# Patient Record
Sex: MALE | Race: WHITE | NOT HISPANIC OR LATINO
[De-identification: names, ages, dates, MRNs, and addresses within clinical notes are randomized per-mention and may not be internally consistent; named-entity substitution may affect disease eponyms.]

---

## 2024-08-05 ENCOUNTER — NON-APPOINTMENT (OUTPATIENT)
Age: 24
End: 2024-08-05

## 2024-08-05 ENCOUNTER — APPOINTMENT (OUTPATIENT)
Dept: ORTHOPEDIC SURGERY | Facility: CLINIC | Age: 24
End: 2024-08-05

## 2024-08-05 PROBLEM — Z00.00 ENCOUNTER FOR PREVENTIVE HEALTH EXAMINATION: Status: ACTIVE | Noted: 2024-08-05

## 2024-08-05 PROCEDURE — 73110 X-RAY EXAM OF WRIST: CPT | Mod: 50

## 2024-08-05 PROCEDURE — 99204 OFFICE O/P NEW MOD 45 MIN: CPT

## 2024-08-05 NOTE — HISTORY OF PRESENT ILLNESS
[Right] : right hand dominant [FreeTextEntry1] : Date of injury: August 2, 2024  Patient here 3 days status post left distal radius fracture which was manipulated 3 days ago.  Patient presents in a sugar-tong splint.

## 2024-08-05 NOTE — ASSESSMENT
[FreeTextEntry1] : Intra-articular left distal radius 's type fracture with associated ulnar styloid fracture  The risks, benefits, alternatives and associated differential diagnosis was discussed with the patient. Options ranged from conservative care, therapy to surgical intervention were reviewed and all questions answered. Risks included incomplete resolution of symptoms, worsening of symptoms recurrence, tissue loss, functional loss and other risks associated with treatment of this condition Patient appeared to have an excellent understanding of the risks as well as differential diagnosis associated with this condition. Vitamin C prophylaxis discussed Patient would like to proceed with open reduction internal fixation of the left distal radius intra-articular fracture.  (Arthrex plate) assessment of the scapholunate ligament will be performed under fluoroscopy and if necessary repaired.

## 2024-08-05 NOTE — PHYSICAL EXAM
[de-identified] : After x-rays were reviewed and discussion of the options from surgical to nonsurgical he was considering surgery.  The splint was removed for better evaluation.  The skin was intact there was no evidence of infection the compartments were soft there is no discomfort upon passive flexion extension abduction or adduction of the digits.  There is swelling ecchymosis as well as tenderness localized over the left distal radius with limited range of motion of the radial ulnar joint and wrist joint secondary to pain and discomfort.  Good capillary refill negative Tinel's over the median nerve and sensation grossly intact [de-identified] : PA lateral and oblique of the right wrist shows a  type fracture of the left distal radius with intra-articular extension.  There is an associated ulnar styloid fracture.  PA lateral and oblique of the right wrist shows ulnar negative variance with joint spaces well-preserved  Scapholunate ligament views were limited due to discomfort but there was no evidence of diastases of the scapholunate ligament and between the 2 structures.  It was symmetric to the opposite side.  No other carpal fractures noted. street drug/inhalant/medication abuse/caffeine

## 2024-08-07 ENCOUNTER — TRANSCRIPTION ENCOUNTER (OUTPATIENT)
Age: 24
End: 2024-08-07

## 2024-08-07 RX ORDER — CHLORHEXIDINE GLUCONATE 500 MG/1
1 CLOTH TOPICAL DAILY
Refills: 0 | Status: DISCONTINUED | OUTPATIENT
Start: 2024-08-08 | End: 2024-08-08

## 2024-08-07 NOTE — ASU DISCHARGE PLAN (ADULT/PEDIATRIC) - CARE PROVIDER_API CALL
Danilo Lynn  [facility]  [address]  [place] [address]  Phone: [phone]  Fax: [phone]  Established Patient  Follow Up Time:

## 2024-08-07 NOTE — ASU PATIENT PROFILE, ADULT - FALL HARM RISK - UNIVERSAL INTERVENTIONS
Bed in lowest position, wheels locked, appropriate side rails in place/Call bell, personal items and telephone in reach/Instruct patient to call for assistance before getting out of bed or chair/Non-slip footwear when patient is out of bed/[place] to call system/Physically safe environment - no spills, clutter or unnecessary equipment/Purposeful Proactive Rounding/Room/bathroom lighting operational, light cord in reach

## 2024-08-07 NOTE — ASU PATIENT PROFILE, ADULT - NSICDXPASTMEDICALHX_GEN_ALL_CORE_FT
PAST MEDICAL HISTORY:  No pertinent past medical history PAST MEDICAL HISTORY:  ADD (attention deficit disorder)     History of OCD (obsessive compulsive disorder)     History of wrist fracture left     PAST MEDICAL HISTORY:  ADD (attention deficit disorder)     Childhood asthma     History of OCD (obsessive compulsive disorder)     History of wrist fracture left

## 2024-08-07 NOTE — BRIEF OPERATIVE NOTE - NSICDXBRIEFPOSTOP_GEN_ALL_CORE_FT
POST-OP DIAGNOSIS:  Displaced fracture of distal end of left radius 07-Aug-2024 07:31:39  Adryan Gonzalez

## 2024-08-07 NOTE — ASU DISCHARGE PLAN (ADULT/PEDIATRIC) - NS MD DC FALL RISK RISK
For information on Fall & Injury Prevention, visit: [email or web address] OR  [email or web address] OR  https://www.cdc.gov/steadi/patient.html

## 2024-08-07 NOTE — BRIEF OPERATIVE NOTE - NSICDXBRIEFPROCEDURE_GEN_ALL_CORE_FT
PROCEDURES:  Open reduction and internal fixation (ORIF) of 3 or more fragments of distal radius 07-Aug-2024 07:30:35  Adryan Gonzalez   PROCEDURES:  Open reduction and internal fixation (ORIF) of 3 or more fragments of distal radius 07-Aug-2024 07:30:35 Left distal radius and assessment of SL ligament under fluoroscopy Adryan Gonzalez

## 2024-08-07 NOTE — ASU DISCHARGE PLAN (ADULT/PEDIATRIC) - ASU DC SPECIAL INSTRUCTIONSFT
Co-Directors: Danilo Lynn MD; Gary Sun MD; Julius Colon MD   [facility] [address] 	  [place] [address] 	 	  Phone [phone] (HAND), Fax [phone]    [email or web address]    Hand Surgery Post Operative Instructions      DRESSING CARE:  1.	Please keep bandage ON and DRY until you return to the office for your 1st postoperative visit.   2.	In the shower you must cover bandage with a plastic bag. You can use tape or a rubber band so no water leaks into the bag.   3.	Please do not exercise as that leads to excessive sweating as the bandage will therefore become moist/ wet.    4.	Do not remove or change your bandage. You may apply more tape if dressing starts to unravel.   5.	Please do not insert any objects, such as a pencil, down into the bandage.     ELEVATION:  1.	Keep hand/wrist above heart level at all times or until bandage feels loose. This will help with swelling of the fingers and can be accomplished by using the FOAM PILLOW.   2.	 A sling will not hold your hand/wrist above your heart and therefore its use should be limited (it may also cause shoulder stiffness).     ACTIVITY:  1.	Moving your fingers daily after surgery is very important to prevent stiffness. Please open your fingers completely and close your fingers completely to achieve full range of motion.  **UNLESS TENDON REPAIR OR NERVE REPAIR SURGERY PERFORMED    2.	Move all joints of the extremity that are not immobilized to prevent stiffness (i.e. shoulder, elbow, fingers, and thumb unless instructed otherwise).   3.	Avoid activities which may re-injure your hand or finger.     DIET:   Regular diet. Start light and progress as tolerated.     PAIN MEDICINE:   1.	Pain medicine was sent to your pharmacy.  2.	Take pain medicine on an “AS NEEDED” basis according to your doctor’s instructions.   3.	Your pain will decrease over the next few days allowing you to:   •	Decrease your pain medicine quantity until you stop.   •	Increase the time between doses until you stop.   4.	You should not drink alcoholic beverages while on pain medication.   5.	Take pain medicine with food to prevent nausea.   6.	Constipation can occur. If no bowel movement occurs within 48 hours take a laxative of your choice (over the counter).	 	      CONTACT PHYSICIAN FOR:   Slight pain, swelling and bluish discoloration are to be expected. If you have breathing difficulty or chest pain dial 911 immediately. However, if the following symptoms occur notify your physician:   •	Temperature above 101° F 	        • Inability to urinate in 8 hours   •	Uncontrolled nausea/vomiting   	• Progressively increasing pain   •	Signs of wound infection 	                • Excessive bleeding  (Redness, swelling, pus-like drainage)     • Increasing numbness   •	Excessive swelling and tightness 	• Splint or cast that is too tight      OFFICE APPOINTMENT:    A staff member from the office will call you in the next 1-2 days to schedule your 1st Post Operative appointment to see your physician back in the office. *IF someone does not reach out to you in the next 1-2 days please call the office.      Patient Name _________________________________ Signature ______________________________ Date__________    Witness _____________________________________________________ Date ______________

## 2024-08-07 NOTE — ASU PATIENT PROFILE, ADULT - NSCAFFEINETYPE_GEN_ALL_CORE_SD
coffee Olumiant Counseling: I discussed with the patient the risks of Olumiant therapy including but not limited to upper respiratory tract infections, shingles, cold sores, and nausea. Live vaccines should be avoided.  This medication has been linked to serious infections; higher rate of mortality; malignancy and lymphoproliferative disorders; major adverse cardiovascular events; thrombosis; gastrointestinal perforations; neutropenia; lymphopenia; anemia; liver enzyme elevations; and lipid elevations.

## 2024-08-07 NOTE — ASU PATIENT PROFILE, ADULT - NSICDXPASTSURGICALHX_GEN_ALL_CORE_FT
PAST SURGICAL HISTORY:  H/O oral surgery      Slit Excision Additional Text (Leave Blank If You Do Not Want): A linear line was drawn on the skin overlying the lesion. An incision was made slowly until the lesion was visualized.  Once visualized, the lesion was removed with blunt dissection.

## 2024-08-07 NOTE — ASU PATIENT PROFILE, ADULT - NS PREOP UNDERSTANDS INFO
Time by MD, As per MD nothing to eat or drink after midnight tonight; patient reminded to come with photo ID/insurance/credit card; dress in comfortable clothes; no jewelries/contact lens/valuable; no smoking/alcohol drinking/recreational drug use today; escort to have photo ID; address and callback number was given/yes

## 2024-08-07 NOTE — BRIEF OPERATIVE NOTE - NSICDXBRIEFPREOP_GEN_ALL_CORE_FT
PRE-OP DIAGNOSIS:  Displaced fracture of distal end of left radius 07-Aug-2024 07:31:21  Adryan Gonzalez

## 2024-08-08 ENCOUNTER — APPOINTMENT (OUTPATIENT)
Dept: ORTHOPEDIC SURGERY | Facility: AMBULATORY SURGERY CENTER | Age: 24
End: 2024-08-08

## 2024-08-08 ENCOUNTER — OUTPATIENT (OUTPATIENT)
Dept: OUTPATIENT SERVICES | Facility: HOSPITAL | Age: 24
LOS: 1 days | Discharge: HOME | End: 2024-08-08

## 2024-08-08 VITALS
OXYGEN SATURATION: 98 % | TEMPERATURE: 98 F | DIASTOLIC BLOOD PRESSURE: 68 MMHG | SYSTOLIC BLOOD PRESSURE: 104 MMHG | RESPIRATION RATE: 16 BRPM | HEART RATE: 64 BPM

## 2024-08-08 VITALS
WEIGHT: 205.03 LBS | OXYGEN SATURATION: 99 % | RESPIRATION RATE: 15 BRPM | HEIGHT: 70.5 IN | SYSTOLIC BLOOD PRESSURE: 117 MMHG | DIASTOLIC BLOOD PRESSURE: 76 MMHG | TEMPERATURE: 98 F

## 2024-08-08 DIAGNOSIS — Z98.890 OTHER SPECIFIED POSTPROCEDURAL STATES: Chronic | ICD-10-CM

## 2024-08-08 PROCEDURE — 25609 OPTX DST RD XART FX/EP SEP3+: CPT | Mod: LT

## 2024-08-08 DEVICE — PLATE STD 3H LEFT: Type: IMPLANTABLE DEVICE | Site: LEFT | Status: FUNCTIONAL

## 2024-08-08 DEVICE — SCREW CORT LO PRO 3.5X14MM: Type: IMPLANTABLE DEVICE | Site: LEFT | Status: FUNCTIONAL

## 2024-08-08 DEVICE — SCREW VAL KREULOCK 2.4X16MM: Type: IMPLANTABLE DEVICE | Site: LEFT | Status: FUNCTIONAL

## 2024-08-08 DEVICE — IMPLANTABLE DEVICE: Type: IMPLANTABLE DEVICE | Site: LEFT | Status: FUNCTIONAL

## 2024-08-08 DEVICE — GWIRE TROCAR TIP 1.35X150MM: Type: IMPLANTABLE DEVICE | Site: LEFT | Status: FUNCTIONAL

## 2024-08-08 DEVICE — SCREW LOKG PT NEAR CORT 2.4X18MM: Type: IMPLANTABLE DEVICE | Site: LEFT | Status: FUNCTIONAL

## 2024-08-08 RX ORDER — OXYCODONE HYDROCHLORIDE 30 MG/1
5 TABLET ORAL ONCE
Refills: 0 | Status: DISCONTINUED | OUTPATIENT
Start: 2024-08-08 | End: 2024-08-08

## 2024-08-08 RX ORDER — ONDANSETRON HCL/PF 4 MG/2 ML
4 VIAL (ML) INJECTION ONCE
Refills: 0 | Status: DISCONTINUED | OUTPATIENT
Start: 2024-08-08 | End: 2024-08-08

## 2024-08-08 RX ORDER — DEXTROSE MONOHYDRATE, SODIUM CHLORIDE, SODIUM LACTATE, CALCIUM CHLORIDE, MAGNESIUM CHLORIDE 1.5; 538; 448; 18.4; 5.08 G/100ML; MG/100ML; MG/100ML; MG/100ML; MG/100ML
1000 SOLUTION INTRAPERITONEAL
Refills: 0 | Status: DISCONTINUED | OUTPATIENT
Start: 2024-08-08 | End: 2024-08-08

## 2024-08-08 RX ORDER — LISDEXAMFETAMINE DIMESYLATE 10 MG/1
1 CAPSULE ORAL
Refills: 0 | DISCHARGE

## 2024-08-08 RX ORDER — ACETAMINOPHEN 500 MG
650 TABLET ORAL ONCE
Refills: 0 | Status: DISCONTINUED | OUTPATIENT
Start: 2024-08-08 | End: 2024-08-08

## 2024-08-08 RX ORDER — FLUVOXAMINE MALEATE 50 MG/1
1 TABLET ORAL
Refills: 0 | DISCHARGE

## 2024-08-08 RX ORDER — FLUVOXAMINE MALEATE 50 MG/1
2 TABLET ORAL
Refills: 0 | DISCHARGE

## 2024-08-08 RX ADMIN — CHLORHEXIDINE GLUCONATE 1 APPLICATION(S): 500 CLOTH TOPICAL at 11:31

## 2024-08-08 NOTE — PRE-ANESTHESIA EVALUATION ADULT - NSANTHOSAYNRD_GEN_A_CORE
No. PRANAV screening performed.  STOP BANG Legend: 0-2 = LOW Risk; 3-4 = INTERMEDIATE Risk; 5-8 = HIGH Risk

## 2024-08-12 ENCOUNTER — APPOINTMENT (OUTPATIENT)
Dept: ORTHOPEDIC SURGERY | Facility: CLINIC | Age: 24
End: 2024-08-12
Payer: COMMERCIAL

## 2024-08-12 PROCEDURE — 29075 APPL CST ELBW FNGR SHORT ARM: CPT | Mod: 58,LT

## 2024-08-12 PROCEDURE — 99024 POSTOP FOLLOW-UP VISIT: CPT

## 2024-08-12 NOTE — HISTORY OF PRESENT ILLNESS
[___ Days Post Op] : post op day #[unfilled] [de-identified] : DOS: 8/8/24 [de-identified] : 4 days s/p ORIF left distal radius fracture The dressings got wet from bath water at the level of the elbow. Bandage Change [de-identified] : The incision line is clean and dry he is moving the wrist and digits well without evidence of crepitus or instability good capillary refill negative Tinel's sensation grossly intact. [de-identified] : 4 days s/p ORIF left distal radius fracture [de-identified] : A short arm cast was applied and home program outlined to reduce risk of complications.  Return to the office in 1 week

## 2024-08-19 ENCOUNTER — APPOINTMENT (OUTPATIENT)
Dept: ORTHOPEDIC SURGERY | Facility: CLINIC | Age: 24
End: 2024-08-19
Payer: COMMERCIAL

## 2024-08-19 DIAGNOSIS — S69.92XD UNSPECIFIED INJURY OF LEFT WRIST, HAND AND FINGER(S), SUBSEQUENT ENCOUNTER: ICD-10-CM

## 2024-08-19 PROCEDURE — 73110 X-RAY EXAM OF WRIST: CPT | Mod: LT

## 2024-08-19 PROCEDURE — 99024 POSTOP FOLLOW-UP VISIT: CPT

## 2024-08-19 NOTE — HISTORY OF PRESENT ILLNESS
[___ Days Post Op] : post op day #[unfilled] [Clean/Dry/Intact] : clean, dry and intact [Healed] : healed [Erythema] : not erythematous [Discharge] : absent of discharge [Dehiscence] : not dehisced [Neuro Intact] : an unremarkable neurological exam [Vascular Intact] : ~T peripheral vascular exam normal [Doing Well] : is doing well [No Sign of Infection] : is showing no signs of infection [Adequate Pain Control] : has adequate pain control [de-identified] :  DOS: 8/8/24. [de-identified] : 11 days s/p ORIF left distal radius fracture [de-identified] : The incision line is clean and dry and there is no evidence of infection.  There is no major changes in alignment as compared to previous x-rays surgery.  Good capillary refill negative Tinel's sensation grossly intact. [de-identified] : PA lateral and oblique shows no major lineages in alignment as compared to the operating room. [de-identified] : 11 days s/p ORIF left distal radius fracture [de-identified] : Options were discussed ranging from casting to splinting.  Patient and father both state that he will be extremely compliant with a splint protocol.  A removable splint for hygiene was provided but the importance of wearing it to reduce the risk of loss of reduction was discussed.  Return to the office in 2 weeks earlier if there are issues or concerns discussed

## 2024-08-19 NOTE — HISTORY OF PRESENT ILLNESS
[___ Days Post Op] : post op day #[unfilled] [Clean/Dry/Intact] : clean, dry and intact [Healed] : healed [Erythema] : not erythematous [Discharge] : absent of discharge [Dehiscence] : not dehisced [Neuro Intact] : an unremarkable neurological exam [Vascular Intact] : ~T peripheral vascular exam normal [Doing Well] : is doing well [No Sign of Infection] : is showing no signs of infection [Adequate Pain Control] : has adequate pain control [de-identified] :  DOS: 8/8/24. [de-identified] : 11 days s/p ORIF left distal radius fracture [de-identified] : The incision line is clean and dry and there is no evidence of infection.  There is no major changes in alignment as compared to previous x-rays surgery.  Good capillary refill negative Tinel's sensation grossly intact. [de-identified] : PA lateral and oblique shows no major lineages in alignment as compared to the operating room. [de-identified] : 11 days s/p ORIF left distal radius fracture [de-identified] : Options were discussed ranging from casting to splinting.  Patient and father both state that he will be extremely compliant with a splint protocol.  A removable splint for hygiene was provided but the importance of wearing it to reduce the risk of loss of reduction was discussed.  Return to the office in 2 weeks earlier if there are issues or concerns discussed

## 2024-09-04 ENCOUNTER — APPOINTMENT (OUTPATIENT)
Dept: ORTHOPEDIC SURGERY | Facility: AMBULATORY SURGERY CENTER | Age: 24
End: 2024-09-04
Payer: COMMERCIAL

## 2024-09-04 DIAGNOSIS — S69.92XD UNSPECIFIED INJURY OF LEFT WRIST, HAND AND FINGER(S), SUBSEQUENT ENCOUNTER: ICD-10-CM

## 2024-09-04 PROCEDURE — 73110 X-RAY EXAM OF WRIST: CPT | Mod: LT

## 2024-09-04 PROCEDURE — 99024 POSTOP FOLLOW-UP VISIT: CPT

## 2024-09-04 NOTE — HISTORY OF PRESENT ILLNESS
[___ Weeks Post Op] : [unfilled] weeks post op [de-identified] : DOS: 8/8/24 [de-identified] : 4 weeks status post open reduction internal fixation of left distal radius fracture [de-identified] : PA lateral and oblique of the left wrist: No major changes in alignment with hardware intact.  Callus formation around the fracture site. [de-identified] : The incision line is clean, dry, and intact.  No evidence of infection.  Negative Tinel's.  EPL, FPL are 5 out of 5 strength.  Patient's neurovascular grossly intact. [de-identified] : 4 weeks status post open reduction internal fixation of left distal radius fracture [de-identified] : Home program outlined to reduce the risk of complications Referral to outside occupational therapy Return to the office in 2 weeks to see Dr. Lynn

## 2024-09-04 NOTE — HISTORY OF PRESENT ILLNESS
[___ Weeks Post Op] : [unfilled] weeks post op [de-identified] : DOS: 8/8/24 [de-identified] : 4 weeks status post open reduction internal fixation of left distal radius fracture [de-identified] : The incision line is clean, dry, and intact.  No evidence of infection.  Negative Tinel's.  EPL, FPL are 5 out of 5 strength.  Patient's neurovascular grossly intact. [de-identified] : PA lateral and oblique of the left wrist: No major changes in alignment with hardware intact.  Callus formation around the fracture site. [de-identified] : 4 weeks status post open reduction internal fixation of left distal radius fracture [de-identified] : Home program outlined to reduce the risk of complications Referral to outside occupational therapy Return to the office in 2 weeks to see Dr. Lynn

## 2024-09-23 ENCOUNTER — TRANSCRIPTION ENCOUNTER (OUTPATIENT)
Age: 24
End: 2024-09-23

## 2024-09-23 ENCOUNTER — APPOINTMENT (OUTPATIENT)
Dept: ORTHOPEDIC SURGERY | Facility: CLINIC | Age: 24
End: 2024-09-23
Payer: COMMERCIAL

## 2024-09-23 DIAGNOSIS — S69.92XD UNSPECIFIED INJURY OF LEFT WRIST, HAND AND FINGER(S), SUBSEQUENT ENCOUNTER: ICD-10-CM

## 2024-09-23 PROCEDURE — 99024 POSTOP FOLLOW-UP VISIT: CPT

## 2024-09-23 PROCEDURE — 73110 X-RAY EXAM OF WRIST: CPT | Mod: LT

## 2024-09-23 NOTE — HISTORY OF PRESENT ILLNESS
[de-identified] : DOS: 08/08/2024 [de-identified] : 6 weeks, 4 days s/p open reduction and internal fixation of left distal radius intraarticular fracture utilizing the Arthrex plate  Patient has been doing a home program but has not started formal therapy. [de-identified] : Scar is well-healed no evidence of infection no tenderness over the fracture site carpus or radial ulnar joint or ulnar styloid.  No evidence of instability no tenderness over the flexor or extensor tendons. Pronation/supination is 80/80 symmetric bilaterally Flexion/extension is 80/60 on the right 60/45 on the left  strength 60 pounds on the right 40 pounds on the left Right hand dominant [de-identified] : PA lateral and oblique of the left wrist shows hardware in place with maintained alignment [de-identified] : 6 weeks, 4 days s/p open reduction and internal fixation of left distal radius intraarticular fracture utilizing the Arthrex plate [de-identified] : A home program was outlined which included weaning from the splint as well as activity modifications.  Encouraged him to follow-up with an outside therapist.  Return to the office in 6 to 8 weeks

## 2024-09-23 NOTE — HISTORY OF PRESENT ILLNESS
[de-identified] : DOS: 08/08/2024 [de-identified] : 6 weeks, 4 days s/p open reduction and internal fixation of left distal radius intraarticular fracture utilizing the Arthrex plate  Patient has been doing a home program but has not started formal therapy. [de-identified] : Scar is well-healed no evidence of infection no tenderness over the fracture site carpus or radial ulnar joint or ulnar styloid.  No evidence of instability no tenderness over the flexor or extensor tendons. Pronation/supination is 80/80 symmetric bilaterally Flexion/extension is 80/60 on the right 60/45 on the left  strength 60 pounds on the right 40 pounds on the left Right hand dominant [de-identified] : PA lateral and oblique of the left wrist shows hardware in place with maintained alignment [de-identified] : 6 weeks, 4 days s/p open reduction and internal fixation of left distal radius intraarticular fracture utilizing the Arthrex plate [de-identified] : A home program was outlined which included weaning from the splint as well as activity modifications.  Encouraged him to follow-up with an outside therapist.  Return to the office in 6 to 8 weeks

## 2024-11-18 ENCOUNTER — APPOINTMENT (OUTPATIENT)
Dept: ORTHOPEDIC SURGERY | Facility: CLINIC | Age: 24
End: 2024-11-18
Payer: COMMERCIAL

## 2024-11-18 VITALS — BODY MASS INDEX: 27.92 KG/M2 | HEIGHT: 70 IN | WEIGHT: 195 LBS | RESPIRATION RATE: 16 BRPM

## 2024-11-18 DIAGNOSIS — S69.92XD UNSPECIFIED INJURY OF LEFT WRIST, HAND AND FINGER(S), SUBSEQUENT ENCOUNTER: ICD-10-CM

## 2024-11-18 PROCEDURE — 73110 X-RAY EXAM OF WRIST: CPT | Mod: LT

## 2024-11-18 PROCEDURE — 99213 OFFICE O/P EST LOW 20 MIN: CPT

## 2025-01-16 ENCOUNTER — APPOINTMENT (OUTPATIENT)
Dept: ORTHOPEDIC SURGERY | Facility: CLINIC | Age: 25
End: 2025-01-16
Payer: COMMERCIAL

## 2025-01-16 DIAGNOSIS — S69.92XD UNSPECIFIED INJURY OF LEFT WRIST, HAND AND FINGER(S), SUBSEQUENT ENCOUNTER: ICD-10-CM

## 2025-01-16 PROCEDURE — 73110 X-RAY EXAM OF WRIST: CPT | Mod: LT

## 2025-01-16 PROCEDURE — 99213 OFFICE O/P EST LOW 20 MIN: CPT

## (undated) DEVICE — SUT VICRYL 4-0 18" P-3 UNDYED

## (undated) DEVICE — BLADE SCALPEL SAFETY #15 WITH PLASTIC GREEN HANDLE

## (undated) DEVICE — GLV 8 PROTEXIS (WHITE)

## (undated) DEVICE — SUT ETHILON 5-0 18" P-3

## (undated) DEVICE — DRSG STERISTRIPS 0.5 X 4"

## (undated) DEVICE — SUT VICRYL 4-0 18" PS-2 UNDYED

## (undated) DEVICE — TOURNIQUET CUFF 18" DUAL PORT SINGLE BLADDER W PLC  (BLACK)

## (undated) DEVICE — DRILL BIT ARTHREX 1.7MM

## (undated) DEVICE — WARMING BLANKET LOWER ADULT

## (undated) DEVICE — VENODYNE/SCD SLEEVE CALF MEDIUM

## (undated) DEVICE — PACK HAND

## (undated) DEVICE — MARKING PEN W RULER

## (undated) DEVICE — DRAPE C ARM MINI PACK FOR 6800

## (undated) DEVICE — DRILL BIT ARTHREX 2.5MM

## (undated) DEVICE — DRSG KERLIX ROLL 4.5"